# Patient Record
Sex: MALE | Race: BLACK OR AFRICAN AMERICAN | NOT HISPANIC OR LATINO | Employment: FULL TIME | URBAN - METROPOLITAN AREA
[De-identification: names, ages, dates, MRNs, and addresses within clinical notes are randomized per-mention and may not be internally consistent; named-entity substitution may affect disease eponyms.]

---

## 2020-02-16 ENCOUNTER — HOSPITAL ENCOUNTER (EMERGENCY)
Facility: MEDICAL CENTER | Age: 35
End: 2020-02-16
Attending: EMERGENCY MEDICINE
Payer: COMMERCIAL

## 2020-02-16 ENCOUNTER — APPOINTMENT (OUTPATIENT)
Dept: RADIOLOGY | Facility: MEDICAL CENTER | Age: 35
End: 2020-02-16
Attending: EMERGENCY MEDICINE
Payer: COMMERCIAL

## 2020-02-16 VITALS
BODY MASS INDEX: 23.1 KG/M2 | HEART RATE: 91 BPM | RESPIRATION RATE: 20 BRPM | TEMPERATURE: 98.2 F | HEIGHT: 71 IN | DIASTOLIC BLOOD PRESSURE: 77 MMHG | WEIGHT: 165 LBS | OXYGEN SATURATION: 99 % | SYSTOLIC BLOOD PRESSURE: 126 MMHG

## 2020-02-16 DIAGNOSIS — M54.50 ACUTE MIDLINE LOW BACK PAIN WITHOUT SCIATICA: ICD-10-CM

## 2020-02-16 DIAGNOSIS — S93.492A SPRAIN OF ANTERIOR TALOFIBULAR LIGAMENT OF LEFT ANKLE, INITIAL ENCOUNTER: ICD-10-CM
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
## 2020-02-16 DIAGNOSIS — S09.90XA MILD CLOSED HEAD INJURY, INITIAL ENCOUNTER: ICD-10-CM

## 2020-02-16 PROCEDURE — A9270 NON-COVERED ITEM OR SERVICE: HCPCS | Performed by: EMERGENCY MEDICINE

## 2020-02-16 PROCEDURE — 99284 EMERGENCY DEPT VISIT MOD MDM: CPT

## 2020-02-16 PROCEDURE — 72070 X-RAY EXAM THORAC SPINE 2VWS: CPT

## 2020-02-16 PROCEDURE — 72100 X-RAY EXAM L-S SPINE 2/3 VWS: CPT

## 2020-02-16 PROCEDURE — 700102 HCHG RX REV CODE 250 W/ 637 OVERRIDE(OP): Performed by: EMERGENCY MEDICINE

## 2020-02-16 PROCEDURE — 73610 X-RAY EXAM OF ANKLE: CPT | Mod: LT

## 2020-02-16 RX ORDER — ACETAMINOPHEN 325 MG/1
650 TABLET ORAL ONCE
Status: COMPLETED | OUTPATIENT
Start: 2020-02-16 | End: 2020-02-16

## 2020-02-16 RX ORDER — OXYCODONE HYDROCHLORIDE 5 MG/1
5 TABLET ORAL ONCE
Status: COMPLETED | OUTPATIENT
Start: 2020-02-16 | End: 2020-02-16

## 2020-02-16 RX ADMIN — OXYCODONE HYDROCHLORIDE 5 MG: 5 TABLET ORAL at 19:59

## 2020-02-16 RX ADMIN — ACETAMINOPHEN 650 MG: 325 TABLET, FILM COATED ORAL at 19:21

## 2020-02-17 NOTE — DISCHARGE INSTRUCTIONS
Ankle Sprain    Ice the ankle 15 minutes at a time 4-6 times daily the first two days.  Elevate and rest the ankle as much as possible.  Use splint and/or crutches if helpful and while needed.  Take ibuprofen and Tylenol as needed for pain.  Followup if not much better in 7 days for repeat xrays.    You had an elevated or high normal blood pressure reading today.  This does not necessarily mean you have hypertension.  Please followup with your/a primary physician for comprehensive blood pressure evaluation.  BP Readings from Last 3 Encounters:   02/16/20 116/69         Your caregiver has diagnosed you as suffering from an ankle sprain. Ankle sprain occurs when the ligaments that hold the ankle joint together are stretched or torn. It may take 4 to 6 weeks to heal.    HOME CARE INSTRUCTIONS  Apply ice to the sore area for 15 to 20 minutes four times per day while awake for the first 2 days. Put the ice in a plastic bag and place a towel between the bag of ice and your skin.  After 2 days, you may apply heat to the injury to help relieve pain. You may use a warm heating pad for 15 to 20 minutes four times per day while awake for 2 days or as needed. Do not sleep with a heating pad. If you are diabetic, do not use a heating pad unless instructed to do so.  Keep your leg elevated when possible to lessen swelling.  For Activity: Use crutches with non-weight bearing for 1 week. Then, you may walk on your ankle as the pain allows, or as instructed. Start gradually with weight bearing on the affected ankle.  Continue to use crutches or cane until you can stand on your ankle without causing pain.  If a plaster splint was applied, wear the splint until you are seen for a follow-up examination. Rest it on nothing harder than a pillow the first 24 hours. Do not put weight on it. Do not get it wet. You may take it off to take a shower or bath.   If an air splint was applied, you may blow more air in it or take some out to make  it more comfortable. You may take it off at night and to take a shower or bath.  Wiggle your toes in the splint several times per day if you are able.  You may have been given an elastic bandage to use with the plaster splint or alone. The splint is too tight if you have numbness, tingling, or if your foot becomes cold and blue. Adjust the bandage to make it comfortable.  Take medications as directed by your caregiver. Use acetaminophen (Tylenol®)or ibuprofen (Advil® or Motrin®) as needed for pain.    CALL IF:  You have an increase in bruising, swelling or pain.  You notice coldness of your toes.  Pain relief is not obtained with medications.    SEEK IMMEDIATE MEDICAL ATTENTION IF: your toes are numb or blue or you have severe pain.      [employer]® Patient Information ©2007 [employer].

## 2020-02-17 NOTE — ED PROVIDER NOTES
"ED Provider Note    CHIEF COMPLAINT  Chief Complaint   Patient presents with   • Back Pain   • Ankle Pain       HPI  Alexys Ramey Jr. is a 34 y.o. male who presents with left ankle pain upper and lower back pain after walking into the bathroom and hitting a stick protruding from the wall and falling to the ground.  He says he is difficult to walk on the left ankle.  His pain in the back is severe.  Denies radiation of the pain weakness or numbness.  No loss of consciousness.  He struck his forehead.    REVIEW OF SYSTEMS  Pertinent positives include: Head injury, fall, upper back pain, low back pain, left ankle pain.  Pertinent negatives include: Weakness, numbness, chest pain, abdominal pain.    PAST MEDICAL HISTORY  Denies.    SOCIAL HISTORY  Social History     Tobacco Use   • Smoking status: Current Every Day Smoker     Packs/day: 0.50   • Smokeless tobacco: Never Used   Substance Use Topics   • Alcohol use: Not on file   • Drug use: Not on file       CURRENT MEDICATIONS  Took ibuprofen after injury 1-1/2 hours ago    ALLERGIES  No Known Allergies    PHYSICAL EXAM  VITAL SIGNS: /69   Pulse 90   Temp 36.6 °C (97.9 °F) (Temporal)   Resp 18   Ht 1.803 m (5' 11\")   Wt 74.8 kg (165 lb)   SpO2 98%   BMI 23.01 kg/m²   Constitutional :  Well developed, Well nourished, well-appearing.   HNT: Small red lucila left forehead no hematoma or CSF leaks.   Ears: No CSF leaks.  Eyes: pupils reactive 3 mm without eye discharge nor conjunctival hyperemia.  Extraocular movements intact  Neck: Normal range of motion, No tenderness, Supple.   Cardiovascular: Regular rhythm, No murmurs, No rubs, No gallops.  No cyanosis.   Respiratory: No rales, rhonchi, wheeze  Abdomen:  Soft, nontender  Skin: Warm, dry, no erythema, no rash.   Musculoskeletal: no limb deformities.  Tender over the lateral malleolus more than the talofibular ligaments of the left ankle.  No foot or posterior malleolar tenderness.  No medial " tenderness.  No edema or ecchymosis.  No proximal leg tenderness.  Diffuse pain without point tenderness thoracic and lumbar spine.  No cervical spine tenderness.    RADIOLOGY:  DX-ANKLE 3+ VIEWS LEFT   Final Result      No radiographic evidence of acute injury      DX-LUMBAR SPINE-2 OR 3 VIEWS   Final Result      Negative lumbar spine series.      DX-THORACIC SPINE-2 VIEWS   Final Result      No radiographic evidence of acute traumatic injury      Mild endplate spurring without disc height loss.          Medications   acetaminophen (TYLENOL) tablet 650 mg (650 mg Oral Given 2/16/20 1921)   oxyCODONE immediate-release (ROXICODONE) tablet 5 mg (5 mg Oral Given 2/16/20 1959)     Patient given Velcro air splint    COURSE & MEDICAL DECISION MAKING  Well-appearing patient presents with a very minor head injury causing a fall with an associated left ankle sprain and diffuse upper and lower back pain.  There is no evidence of fracture.  There is no evidence of neurologic deficit.    PLAN:  Ice  Velcro air splint as needed  Ibuprofen and Tylenol for pain  Leg elevation  Ankle sprain handout given    [address] [address]  [phone]  Schedule an appointment as soon as possible for a visit   As needed      CONDITION:  Good.    FINAL IMPRESSION:  1. Sprain of anterior talofibular ligament of left ankle, initial encounter    2. Acute midline low back pain without sciatica    3. Mild closed head injury, initial encounter    4. Fall, initial encounter          Electronically signed by: Stewart Simental M.D., 2/16/2020

## 2020-02-17 NOTE — ED TRIAGE NOTES
Patient c/o L ankle pain and lower back pain after he tripped and fell while walking. Patient took ibuprofen 1 hour ago without any effect.

## 2022-03-30 NOTE — ED NOTES
Monitor for increased risk corneal edema. Pt refused to apply slint; requested to take home; ERP notified;